# Patient Record
Sex: FEMALE | Race: BLACK OR AFRICAN AMERICAN | HISPANIC OR LATINO | Employment: FULL TIME | URBAN - METROPOLITAN AREA
[De-identification: names, ages, dates, MRNs, and addresses within clinical notes are randomized per-mention and may not be internally consistent; named-entity substitution may affect disease eponyms.]

---

## 2021-07-21 ENCOUNTER — OFFICE VISIT (OUTPATIENT)
Dept: URGENT CARE | Facility: MEDICAL CENTER | Age: 36
End: 2021-07-21
Payer: COMMERCIAL

## 2021-07-21 VITALS
HEIGHT: 61 IN | OXYGEN SATURATION: 98 % | RESPIRATION RATE: 18 BRPM | HEART RATE: 75 BPM | SYSTOLIC BLOOD PRESSURE: 137 MMHG | DIASTOLIC BLOOD PRESSURE: 82 MMHG | WEIGHT: 167 LBS | TEMPERATURE: 98.7 F | BODY MASS INDEX: 31.53 KG/M2

## 2021-07-21 DIAGNOSIS — H60.502 ACUTE OTITIS EXTERNA OF LEFT EAR, UNSPECIFIED TYPE: Primary | ICD-10-CM

## 2021-07-21 PROCEDURE — 99203 OFFICE O/P NEW LOW 30 MIN: CPT | Performed by: PHYSICIAN ASSISTANT

## 2021-07-21 RX ORDER — OFLOXACIN 3 MG/ML
10 SOLUTION AURICULAR (OTIC) DAILY
Qty: 5 ML | Refills: 0 | Status: SHIPPED | OUTPATIENT
Start: 2021-07-21

## 2021-07-21 RX ORDER — PANTOPRAZOLE SODIUM 20 MG/1
20 TABLET, DELAYED RELEASE ORAL DAILY
COMMUNITY

## 2021-07-21 RX ORDER — LISINOPRIL 10 MG/1
10 TABLET ORAL DAILY
COMMUNITY
Start: 2020-08-07 | End: 2021-08-07

## 2021-07-22 NOTE — PROGRESS NOTES
[facility]        NAME: Jessie Arita is a 38 y o  female  : 1985    MRN: [id number]  DATE: 2021  TIME: 8:24 PM    Assessment and Plan   Acute otitis externa of left ear, unspecified type [H60 502]  1  Acute otitis externa of left ear, unspecified type  ofloxacin (FLOXIN) 0 3 % otic solution         Patient Instructions       Patient was educated on outer ear infection  Patient was educated on antibiotic ear drops  Patient was told if symptoms get worst got to ED or follow up with PCP  Chief Complaint     Chief Complaint   Patient presents with    Earache     Patient c/o L ear pain x 5 days          History of Present Illness       Patient is here today complaining of left ear pain that started on 21  Patient reports the pain in left ear was 10/10 but over the last two days its an 8/10  Denies any allergies to medications  Patient reports her left outer ear hurts  Patient reports pain laying on left ear  Patient reports allergy to acetaminophen  Review of Systems   Review of Systems   Constitutional: Negative  HENT: Positive for ear pain  Respiratory: Negative  Cardiovascular: Negative  Neurological: Negative  Psychiatric/Behavioral: Negative            Current Medications       Current Outpatient Medications:     lisinopril (ZESTRIL) 10 mg tablet, Take 10 mg by mouth daily, Disp: , Rfl:     pantoprazole (PROTONIX) 20 mg tablet, Take 20 mg by mouth daily, Disp: , Rfl:     ofloxacin (FLOXIN) 0 3 % otic solution, Administer 10 drops into the left ear daily, Disp: 5 mL, Rfl: 0    Current Allergies     Allergies as of 2021 - Reviewed 2021   Allergen Reaction Noted    Tylenol [acetaminophen] Rash 2021            The following portions of the patient's history were reviewed and updated as appropriate: allergies, current medications, past family history, past medical history, past social history, past surgical history and problem list  History reviewed  No pertinent past medical history  History reviewed  No pertinent surgical history  History reviewed  No pertinent family history  Medications have been verified  Objective   /82   Pulse 75   Temp 98 7 °F (37 1 °C)   Resp 18   Ht 5' 1" (1 549 m)   Wt 75 8 kg (167 lb)   SpO2 98%   BMI 31 55 kg/m²   No LMP recorded  Physical Exam     Physical Exam  Constitutional:       Appearance: She is normal weight  HENT:      Right Ear: Tympanic membrane, ear canal and external ear normal       Left Ear: Tympanic membrane normal       Ears:      Comments: No pain over right tragus or posterior ear  Pain of left tragus  No pain over left posterior ear  Left ear canal is red and swollen  Cardiovascular:      Rate and Rhythm: Normal rate and regular rhythm  Heart sounds: Normal heart sounds  Pulmonary:      Effort: Pulmonary effort is normal       Breath sounds: Normal breath sounds  Neurological:      General: No focal deficit present  Mental Status: She is alert and oriented to person, place, and time     Psychiatric:         Mood and Affect: Mood normal          Behavior: Behavior normal

## 2021-07-22 NOTE — PATIENT INSTRUCTIONS
Patient was educated on outer ear infection  Patient was educated on antibiotic ear drops  Patient was told if symptoms get worst got to ED or follow up with PCP  Otitis Externa, Ambulatory Care   GENERAL INFORMATION:   Otitis externa , or swimmer's ear, is an infection in the outer ear canal  This canal goes from the outside of the ear to the eardrum  Common symptoms include the following:   · Ear pain    · Outer ear canal is red and swollen    · Clear fluid or pus is leaking out of your ear    · Outer ear canal is itchy and you see a rash    · Trouble hearing because your ear is plugged    · Feel a bump in your ear canal, called a polyp    · Flakes of skin fall from your ear  Seek immediate care for the following symptoms:   · Fever    · Severe ear pain    · Sudden inability to hear at all    · New swelling in your face, behind your ears, or in your neck    · Sudden inability to move part of your face    · Sudden numbness in your face  Treatment for otitis externa  may include any of the following:  · NSAIDs  help decrease swelling and pain or fever  This medicine is available with or without a doctor's order  NSAIDs can cause stomach bleeding or kidney problems in certain people  If you take blood thinner medicine, always ask your healthcare provider if NSAIDs are safe for you  Always read the medicine label and follow directions  · Ear drops  are a combination of a steroid medicine and an antibiotic  The steroid helps decrease redness, swelling, and pain  The antibiotic helps kill the germs that caused your ear infection  · Ear wicking  may remove fluid or wax from your outer ear canal  Your healthcare provider may insert a small tube, called a wick, into your ear to help drain fluid  A wick also may be used to put medicine into your ear canal if the canal is blocked  Follow the steps below to use eardrops:   · Lie down on your side with your infected ear facing up      · Carefully drip the correct number of eardrops into your ear  Have another person help you if possible  · Gently move the outside part of your ear back and forth to help the medicine reach your ear canal      · Stay lying down in the same position (with your ear facing up) for 3 to 5 minutes  Prevent otitis externa:   · Do not put cotton swabs or foreign objects in your ears  · Wrap a clean moist washcloth around your finger, and use it to clean your outer ear and remove extra ear wax  · Use ear plugs when you swim  Dry your outer ears completely after you swim or bathe  Follow up with your healthcare provider as directed:  Write down your questions so you remember to ask them during your visits  CARE AGREEMENT:   You have the right to help plan your care  Learn about your health condition and how it may be treated  Discuss treatment options with your caregivers to decide what care you want to receive  You always have the right to refuse treatment  The above information is an  only  It is not intended as medical advice for individual conditions or treatments  Talk to your doctor, nurse or pharmacist before following any medical regimen to see if it is safe and effective for you  © 2014 [address] is for End User's use only and may not be sold, redistributed or otherwise used for commercial purposes  All illustrations and images included in CareNotes® are the copyrighted property of A D [employer] M [employer]  or [employer]

## 2023-07-06 ENCOUNTER — OFFICE VISIT (OUTPATIENT)
Dept: URGENT CARE | Age: 38
End: 2023-07-06
Payer: COMMERCIAL

## 2023-07-06 VITALS
TEMPERATURE: 97.8 F | DIASTOLIC BLOOD PRESSURE: 74 MMHG | RESPIRATION RATE: 18 BRPM | SYSTOLIC BLOOD PRESSURE: 132 MMHG | OXYGEN SATURATION: 99 % | HEART RATE: 75 BPM

## 2023-07-06 DIAGNOSIS — H92.01 RIGHT EAR PAIN: Primary | ICD-10-CM

## 2023-07-06 PROCEDURE — 99213 OFFICE O/P EST LOW 20 MIN: CPT

## 2023-07-06 RX ORDER — PANTOPRAZOLE SODIUM 40 MG/1
TABLET, DELAYED RELEASE ORAL
COMMUNITY
Start: 2023-06-28

## 2023-07-06 RX ORDER — FEXOFENADINE HCL 60 MG/1
180 TABLET, FILM COATED ORAL DAILY PRN
COMMUNITY

## 2023-07-06 NOTE — PROGRESS NOTES
[facility]        NAME: Daniella Burns is a 44 y.o. female  : 1985    MRN: [id number]  DATE: 2023  TIME: 9:06 AM    Assessment and Plan   Right ear pain [H92.01]  1. Right ear pain  neomycin-polymyxin-hydrocortisone (CORTISPORIN) otic solution            Patient Instructions     Start Cortisporin as directed. Follow up with PCP in 2-3 days. Proceed to ER if symptoms worsen. Chief Complaint     Chief Complaint   Patient presents with   • Earache     Right ear pain starting Saturday. History of Present Illness     44 y.o. F  R ear pain x 6 days  Reports recent illness  No fevers  Denies swimming or water exposure  Motrin OTC  Denies hearing loss  No trauma or injury    Review of Systems   Review of Systems   Constitutional: Negative for chills, fatigue and fever. HENT: Positive for ear pain. Negative for congestion, facial swelling, hearing loss, rhinorrhea, sinus pressure, sneezing, sore throat and trouble swallowing. Eyes: Negative for pain, redness and visual disturbance. Respiratory: Negative for cough, chest tightness, shortness of breath and wheezing. Cardiovascular: Negative for chest pain and palpitations. Gastrointestinal: Negative for abdominal pain, diarrhea, nausea and vomiting. Genitourinary: Negative for dysuria, flank pain, hematuria and pelvic pain. Musculoskeletal: Negative for arthralgias, back pain and myalgias. Skin: Negative for color change and rash. Neurological: Negative for dizziness, seizures, syncope, weakness, light-headedness and headaches. Psychiatric/Behavioral: Negative for confusion, hallucinations and sleep disturbance. The patient is not nervous/anxious. All other systems reviewed and are negative.         Current Medications       Current Outpatient Medications:   •  neomycin-polymyxin-hydrocortisone (CORTISPORIN) otic solution, Administer 4 drops to the right ear every 6 (six) hours for 7 days, Disp: 10 mL, Rfl: 0  • pantoprazole (PROTONIX) 20 mg tablet, Take 20 mg by mouth daily, Disp: , Rfl:   •  fexofenadine (ALLEGRA) 60 MG tablet, Take 180 mg by mouth daily as needed (Patient not taking: Reported on 7/6/2023), Disp: , Rfl:   •  lisinopril (ZESTRIL) 10 mg tablet, Take 10 mg by mouth daily, Disp: , Rfl:   •  pantoprazole (PROTONIX) 40 mg tablet, , Disp: , Rfl:     Current Allergies     Allergies as of 07/06/2023 - Reviewed 07/06/2023   Allergen Reaction Noted   • Tylenol [acetaminophen] Rash 07/21/2021            The following portions of the patient's history were reviewed and updated as appropriate: allergies, current medications, past family history, past medical history, past social history, past surgical history and problem list.     History reviewed. No pertinent past medical history. History reviewed. No pertinent surgical history. History reviewed. No pertinent family history. Medications have been verified. Objective   /74   Pulse 75   Temp 97.8 °F (36.6 °C)   Resp 18   SpO2 99%   No LMP recorded. Physical Exam     Physical Exam  Vitals reviewed. Constitutional:       General: She is not in acute distress. Appearance: Normal appearance. She is not toxic-appearing. HENT:      Head: Normocephalic. Right Ear: Swelling and tenderness present. Tympanic membrane is erythematous. Tympanic membrane is not bulging. Left Ear: Tympanic membrane normal. Tympanic membrane is not erythematous or bulging. Ears:      Comments:   +pain with pinna traction & tragus palpation  +swelling     Nose: No congestion or rhinorrhea. Right Sinus: No maxillary sinus tenderness or frontal sinus tenderness. Left Sinus: No maxillary sinus tenderness or frontal sinus tenderness. Mouth/Throat:      Pharynx: Uvula midline. No oropharyngeal exudate, posterior oropharyngeal erythema or uvula swelling. Tonsils: No tonsillar exudate or tonsillar abscesses.    Eyes:      Extraocular Movements: Extraocular movements intact. Conjunctiva/sclera: Conjunctivae normal.      Pupils: Pupils are equal, round, and reactive to light. Cardiovascular:      Rate and Rhythm: Normal rate and regular rhythm. Pulses: Normal pulses. Heart sounds: Normal heart sounds. Pulmonary:      Effort: Pulmonary effort is normal. No tachypnea or respiratory distress. Breath sounds: Normal breath sounds and air entry. No decreased breath sounds, wheezing, rhonchi or rales. Abdominal:      General: Bowel sounds are normal.      Palpations: Abdomen is soft. Tenderness: There is no abdominal tenderness. There is no right CVA tenderness or guarding. Musculoskeletal:         General: Normal range of motion. Cervical back: Normal range of motion. Lymphadenopathy:      Cervical: No cervical adenopathy. Skin:     General: Skin is warm and dry. Neurological:      General: No focal deficit present. Mental Status: She is alert. Sensory: Sensation is intact. Motor: Motor function is intact. Coordination: Coordination is intact. Gait: Gait is intact. Deep Tendon Reflexes: Reflexes are normal and symmetric.

## 2023-07-19 ENCOUNTER — OFFICE VISIT (OUTPATIENT)
Dept: URGENT CARE | Age: 38
End: 2023-07-19
Payer: COMMERCIAL

## 2023-07-19 VITALS
HEART RATE: 80 BPM | DIASTOLIC BLOOD PRESSURE: 75 MMHG | OXYGEN SATURATION: 97 % | SYSTOLIC BLOOD PRESSURE: 118 MMHG | RESPIRATION RATE: 18 BRPM | TEMPERATURE: 96.7 F

## 2023-07-19 DIAGNOSIS — H60.502 ACUTE OTITIS EXTERNA OF LEFT EAR, UNSPECIFIED TYPE: Primary | ICD-10-CM

## 2023-07-19 DIAGNOSIS — H66.90 ACUTE OTITIS MEDIA, UNSPECIFIED OTITIS MEDIA TYPE: ICD-10-CM

## 2023-07-19 PROCEDURE — 99213 OFFICE O/P EST LOW 20 MIN: CPT | Performed by: NURSE PRACTITIONER

## 2023-07-19 RX ORDER — OFLOXACIN 3 MG/ML
10 SOLUTION AURICULAR (OTIC) DAILY
Qty: 5 ML | Refills: 0 | Status: SHIPPED | OUTPATIENT
Start: 2023-07-19

## 2023-07-19 RX ORDER — AMOXICILLIN AND CLAVULANATE POTASSIUM 875; 125 MG/1; MG/1
1 TABLET, FILM COATED ORAL EVERY 12 HOURS SCHEDULED
Qty: 14 TABLET | Refills: 0 | Status: SHIPPED | OUTPATIENT
Start: 2023-07-19 | End: 2023-07-26

## 2023-07-19 NOTE — LETTER
July 19, 2023     Patient: Teresa Riddle  YOB: 1985  Date of Visit: 7/19/2023      To Whom it May Concern:    Teresa Riddle is under my professional care. Jarvis Henry was seen in my office on 7/19/2023. Jarvis Henry may return to work on 07/21/2023 as long as symptoms improve.          Sincerely,          KAL Gama        CC: No Recipients

## 2023-07-19 NOTE — PATIENT INSTRUCTIONS
Follow-up with your family doctor as directed  Use antibiotic for the full course orally until its finished. Use eardrops only to the left ear  Right eardrum is normal no need for antibiotic drops in the right ear.   Using Flonase nasal spray to help decrease fluid behind the ear daily for the next 7 to 10 days  No swimming for 1 week and if you do after that wear earplugs  Do not use any ear buds or earplugs for the next few days to a week while using eardrops  Symptoms worsen or continue months 5 your family doctor or see an ENT

## 2023-07-19 NOTE — PROGRESS NOTES
NAME: Brigid Fox is a 44 y.o. female  : 1985    MRN: [id number]    /75   Pulse 80   Temp (!) 96.7 °F (35.9 °C) (Temporal)   Resp 18   SpO2 97%     9:07 AM    Assessment and Plan   Acute otitis externa of left ear, unspecified type [H60.502]  1. Acute otitis externa of left ear, unspecified type  ofloxacin (FLOXIN) 0.3 % otic solution      2. Acute otitis media, unspecified otitis media type  amoxicillin-clavulanate (AUGMENTIN) 875-125 mg per tablet          Molly Moran was seen today for earache. Diagnoses and all orders for this visit:    Acute otitis externa of left ear, unspecified type  -     ofloxacin (FLOXIN) 0.3 % otic solution; Administer 10 drops into the left ear daily    Acute otitis media, unspecified otitis media type  -     amoxicillin-clavulanate (AUGMENTIN) 875-125 mg per tablet; Take 1 tablet by mouth every 12 (twelve) hours for 7 days        Patient Instructions     Patient Instructions   Follow-up with your family doctor as directed  Use antibiotic for the full course orally until its finished. Use eardrops only to the left ear  Right eardrum is normal no need for antibiotic drops in the right ear. Using Flonase nasal spray to help decrease fluid behind the ear daily for the next 7 to 10 days  No swimming for 1 week and if you do after that wear earplugs  Do not use any ear buds or earplugs for the next few days to a week while using eardrops  Symptoms worsen or continue months 5 your family doctor or see an ENT      Proceed to the nearest ER if symptoms worsen, Follow up with your PCP  Continue to social distance, wash your hands, and wear your masks. Please continue to follow the CDC. gov guidelines daily for they are subject to change on COVID-19    Chief Complaint     Chief Complaint   Patient presents with   • Earache     Pt c/o left ear pain about one week ago. Pt was seen here  and reports having left ear pain then but went away.           History of Present Illness 65-year-old female came in today with left ear pain that has been present for the past few days. She was seen here on July 7 with right ear pain was seen by prior provider and treated with eardrops which has now resolved. She has no pain to the right ear however now she has pain to the left ear with discharge and pain in the left sinuses. 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 She has no fever sinus congestion sore throat chest pain shortness of breath nausea vomiting diarrhea body aches at this time          Review of Systems   Review of Systems   Constitutional: Negative. HENT: Positive for ear pain (Left ear pain) and postnasal drip. Negative for congestion, sneezing and sore throat. Eyes: Negative. Respiratory: Negative. Negative for shortness of breath. Cardiovascular: Negative. Gastrointestinal: Negative. Musculoskeletal: Negative. Neurological: Negative. Hematological: Negative. Psychiatric/Behavioral: Negative.           Current Medications       Current Outpatient Medications:   •  amoxicillin-clavulanate (AUGMENTIN) 875-125 mg per tablet, Take 1 tablet by mouth every 12 (twelve) hours for 7 days, Disp: 14 tablet, Rfl: 0  •  ofloxacin (FLOXIN) 0.3 % otic solution, Administer 10 drops into the left ear daily, Disp: 5 mL, Rfl: 0  •  fexofenadine (ALLEGRA) 60 MG tablet, Take 180 mg by mouth daily as needed (Patient not taking: Reported on 7/6/2023), Disp: , Rfl:   •  lisinopril (ZESTRIL) 10 mg tablet, Take 10 mg by mouth daily, Disp: , Rfl:   •  pantoprazole (PROTONIX) 20 mg tablet, Take 20 mg by mouth daily (Patient not taking: Reported on 7/19/2023), Disp: , Rfl:   •  pantoprazole (PROTONIX) 40 mg tablet, , Disp: , Rfl:     Current Allergies     Allergies as of 07/19/2023 - Reviewed 07/19/2023   Allergen Reaction Noted   • Tylenol [acetaminophen] Rash 07/21/2021 History reviewed. No pertinent past medical history. History reviewed. No pertinent surgical history. History reviewed. No pertinent family history. Medications have been verified. The following portions of the patient's history were reviewed and updated as appropriate: allergies, current medications, past family history, past medical history, past social history, past surgical history and problem list.    Objective   /75   Pulse 80   Temp (!) 96.7 °F (35.9 °C) (Temporal)   Resp 18   SpO2 97%      Physical Exam     Physical Exam  Constitutional:       Appearance: Normal appearance. She is not ill-appearing. HENT:      Head: Normocephalic. No right periorbital erythema. Jaw: There is normal jaw occlusion. Right Ear: Hearing, tympanic membrane, ear canal and external ear normal. No decreased hearing noted. Left Ear: Hearing, ear canal and external ear normal. No decreased hearing noted. Drainage, swelling and tenderness present. No mastoid tenderness. Tympanic membrane is not bulging. Ears:      Comments: White discharge draining from the left ear, she used prior eardrops to the right ear to the left which is called Cortisporin drops and has no relief. She is due to for the past 2 days pain is worse. No mastoid tenderness     Nose: Nose normal.      Mouth/Throat:      Lips: Pink. No lesions. Mouth: Mucous membranes are moist.      Tongue: No lesions. Tongue does not deviate from midline. Palate: No mass. Pharynx: Oropharynx is clear. Uvula midline. No pharyngeal swelling or oropharyngeal exudate. Tonsils: No tonsillar exudate or tonsillar abscesses. Cardiovascular:      Rate and Rhythm: Normal rate and regular rhythm. Neurological:      General: No focal deficit present. Mental Status: She is alert and oriented to person, place, and time. Psychiatric:         Behavior: Behavior is cooperative.                Note: Portions of this record may have been created with voice recognition software. Occasional wrong word or "sound a like" substitutions may have occurred due to the inherent limitations of voice recognition software. Please read the chart carefully and recognize, using context, where substitutions have occurred. KAL Vizcarra

## 2024-10-19 ENCOUNTER — OFFICE VISIT (OUTPATIENT)
Dept: URGENT CARE | Age: 39
End: 2024-10-19
Payer: COMMERCIAL

## 2024-10-19 VITALS
TEMPERATURE: 98.4 F | OXYGEN SATURATION: 97 % | SYSTOLIC BLOOD PRESSURE: 121 MMHG | HEART RATE: 84 BPM | DIASTOLIC BLOOD PRESSURE: 80 MMHG | RESPIRATION RATE: 18 BRPM

## 2024-10-19 DIAGNOSIS — H60.312 ACUTE DIFFUSE OTITIS EXTERNA OF LEFT EAR: Primary | ICD-10-CM

## 2024-10-19 PROCEDURE — S9083 URGENT CARE CENTER GLOBAL: HCPCS | Performed by: EMERGENCY MEDICINE

## 2024-10-19 PROCEDURE — G0382 LEV 3 HOSP TYPE B ED VISIT: HCPCS | Performed by: EMERGENCY MEDICINE

## 2024-10-19 RX ORDER — NEOMYCIN SULFATE, POLYMYXIN B SULFATE, HYDROCORTISONE 3.5; 10000; 1 MG/ML; [USP'U]/ML; MG/ML
3 SOLUTION/ DROPS AURICULAR (OTIC) EVERY 6 HOURS SCHEDULED
Qty: 10 ML | Refills: 0 | Status: SHIPPED | OUTPATIENT
Start: 2024-10-19 | End: 2024-10-29

## 2024-10-19 RX ORDER — NEOMYCIN SULFATE, POLYMYXIN B SULFATE, HYDROCORTISONE 3.5; 10000; 1 MG/ML; [USP'U]/ML; MG/ML
3 SOLUTION/ DROPS AURICULAR (OTIC) EVERY 6 HOURS SCHEDULED
Qty: 10 ML | Refills: 0 | Status: SHIPPED | COMMUNITY
Start: 2024-10-19 | End: 2024-10-19

## 2024-10-19 NOTE — PROGRESS NOTES
[facility]        NAME: Elizabeth Alvarez is a 39 y.o. female  : 1985    MRN: [id number]  DATE: 2024  TIME: 10:36 AM    Assessment and Plan   Acute diffuse otitis externa of left ear [H60.312]  1. Acute diffuse otitis externa of left ear  neomycin-polymyxin-hydrocortisone (CORTISPORIN) otic solution    DISCONTINUED: neomycin-polymyxin-hydrocortisone (CORTISPORIN) otic solution        Left ear pain for 5 days. Denies fevers. States has happened in past- wears ear buds for work. Discussed symptom management and cleansing of ear buds.     Patient Instructions       Follow up with PCP in 3-5 days.  Proceed to  ER if symptoms worsen.    If tests have been performed at [facility], our office will contact you with results if changes need to be made to the care plan discussed with you at the visit.  You can review your full results on [facility].    Chief Complaint     Chief Complaint   Patient presents with    Earache     Patient reports left ear pain X 1 week.          History of Present Illness       Left ear pain for 5 days. Denies fevers. States has happened in past- wears ear buds for work. Discussed symptom management and cleansing of ear buds.     Earache         Review of Systems   Review of Systems   Constitutional:  Negative for fever.   HENT:  Positive for congestion and ear pain.    Allergic/Immunologic: Positive for environmental allergies.   All other systems reviewed and are negative.        Current Medications       Current Outpatient Medications:     fexofenadine (ALLEGRA) 60 MG tablet, Take 180 mg by mouth daily as needed, Disp: , Rfl:     neomycin-polymyxin-hydrocortisone (CORTISPORIN) otic solution, Administer 3 drops into the left ear every 6 (six) hours for 10 days, Disp: 10 mL, Rfl: 0    pantoprazole (PROTONIX) 40 mg tablet, , Disp: , Rfl:     lisinopril (ZESTRIL) 10 mg tablet, Take 10 mg by mouth daily, Disp: , Rfl:     pantoprazole (PROTONIX) 20 mg tablet, Take 20 mg  by mouth daily (Patient not taking: Reported on 7/19/2023), Disp: , Rfl:     Current Allergies     Allergies as of 10/19/2024 - Reviewed 10/19/2024   Allergen Reaction Noted    Tylenol [acetaminophen] Rash 07/21/2021            The following portions of the patient's history were reviewed and updated as appropriate: allergies, current medications, past family history, past medical history, past social history, past surgical history and problem list.     No past medical history on file.    No past surgical history on file.    No family history on file.      Medications have been verified.        Objective   /80   Pulse 84   Temp 98.4 °F (36.9 °C)   Resp 18   SpO2 97%   No LMP recorded.       Physical Exam     Physical Exam  Vitals reviewed.   Constitutional:       Appearance: Normal appearance.   HENT:      Left Ear: Hearing and tympanic membrane normal. Swelling and tenderness present.      Ears:      Comments: Erythematous canal  Neurological:      Mental Status: She is alert.